# Patient Record
Sex: MALE | Race: WHITE | NOT HISPANIC OR LATINO | Employment: OTHER | URBAN - METROPOLITAN AREA
[De-identification: names, ages, dates, MRNs, and addresses within clinical notes are randomized per-mention and may not be internally consistent; named-entity substitution may affect disease eponyms.]

---

## 2018-08-23 PROBLEM — Z45.02 BIVENTRICULAR AICD AT END OF LIFE: Status: ACTIVE | Noted: 2018-08-23

## 2020-01-01 ENCOUNTER — LAB VISIT (OUTPATIENT)
Dept: LAB | Facility: HOSPITAL | Age: 70
End: 2020-01-01
Attending: INTERNAL MEDICINE
Payer: MEDICARE

## 2020-01-01 ENCOUNTER — INITIAL CONSULT (OUTPATIENT)
Dept: SURGICAL ONCOLOGY | Facility: CLINIC | Age: 70
End: 2020-01-01
Payer: MEDICARE

## 2020-01-01 ENCOUNTER — OFFICE VISIT (OUTPATIENT)
Dept: HEMATOLOGY/ONCOLOGY | Facility: CLINIC | Age: 70
End: 2020-01-01
Payer: MEDICARE

## 2020-01-01 ENCOUNTER — PATIENT MESSAGE (OUTPATIENT)
Dept: HEMATOLOGY/ONCOLOGY | Facility: CLINIC | Age: 70
End: 2020-01-01

## 2020-01-01 ENCOUNTER — TELEPHONE (OUTPATIENT)
Dept: SURGICAL ONCOLOGY | Facility: CLINIC | Age: 70
End: 2020-01-01

## 2020-01-01 ENCOUNTER — TELEPHONE (OUTPATIENT)
Dept: SURGERY | Facility: CLINIC | Age: 70
End: 2020-01-01

## 2020-01-01 ENCOUNTER — LAB VISIT (OUTPATIENT)
Dept: LAB | Facility: HOSPITAL | Age: 70
End: 2020-01-01
Attending: SURGERY
Payer: MEDICARE

## 2020-01-01 ENCOUNTER — HOSPITAL ENCOUNTER (OUTPATIENT)
Dept: RADIOLOGY | Facility: HOSPITAL | Age: 70
Discharge: HOME | End: 2020-05-21
Attending: SURGERY
Payer: MEDICARE

## 2020-01-01 VITALS
HEIGHT: 69 IN | SYSTOLIC BLOOD PRESSURE: 99 MMHG | RESPIRATION RATE: 18 BRPM | DIASTOLIC BLOOD PRESSURE: 66 MMHG | BODY MASS INDEX: 29.71 KG/M2 | WEIGHT: 200.63 LBS | TEMPERATURE: 98 F | HEART RATE: 82 BPM

## 2020-01-01 DIAGNOSIS — D49.89 NEOPLASM OF ABDOMEN: ICD-10-CM

## 2020-01-01 DIAGNOSIS — Z11.59 SCREENING EXAMINATION FOR POLIOMYELITIS: ICD-10-CM

## 2020-01-01 DIAGNOSIS — N28.89 RIGHT KIDNEY MASS: ICD-10-CM

## 2020-01-01 DIAGNOSIS — C25.1 CANCER OF PANCREAS, BODY: Primary | ICD-10-CM

## 2020-01-01 DIAGNOSIS — C25.1 CANCER OF PANCREAS, BODY: ICD-10-CM

## 2020-01-01 DIAGNOSIS — I50.42 CHRONIC COMBINED SYSTOLIC AND DIASTOLIC CONGESTIVE HEART FAILURE: ICD-10-CM

## 2020-01-01 LAB
CANCER AG19-9 SERPL-ACNC: 30 U/ML (ref 2–40)
CREAT SERPL-MCNC: 0.63 MG/DL (ref 0.5–1.4)
EST. GFR  (AFRICAN AMERICAN): >60 ML/MIN/1.73 M^2
EST. GFR  (NON AFRICAN AMERICAN): >60 ML/MIN/1.73 M^2
SARS-COV-2 RNA RESP QL NAA+PROBE: NOT DETECTED

## 2020-01-01 PROCEDURE — 1159F MED LIST DOCD IN RCRD: CPT | Mod: S$GLB,,, | Performed by: SURGERY

## 2020-01-01 PROCEDURE — 74177 CT ABD & PELVIS W/CONTRAST: CPT | Mod: TC,PO

## 2020-01-01 PROCEDURE — 99999 PR PBB SHADOW E&M-EST. PATIENT-LVL IV: CPT | Mod: PBBFAC,,, | Performed by: SURGERY

## 2020-01-01 PROCEDURE — 82565 ASSAY OF CREATININE: CPT

## 2020-01-01 PROCEDURE — 1125F AMNT PAIN NOTED PAIN PRSNT: CPT | Mod: S$GLB,,, | Performed by: SURGERY

## 2020-01-01 PROCEDURE — 99205 PR OFFICE/OUTPT VISIT, NEW, LEVL V, 60-74 MIN: ICD-10-PCS | Mod: 95,,, | Performed by: INTERNAL MEDICINE

## 2020-01-01 PROCEDURE — 1159F MED LIST DOCD IN RCRD: CPT | Mod: ,,, | Performed by: INTERNAL MEDICINE

## 2020-01-01 PROCEDURE — 1101F PR PT FALLS ASSESS DOC 0-1 FALLS W/OUT INJ PAST YR: ICD-10-PCS | Mod: ,,, | Performed by: INTERNAL MEDICINE

## 2020-01-01 PROCEDURE — 1159F PR MEDICATION LIST DOCUMENTED IN MEDICAL RECORD: ICD-10-PCS | Mod: S$GLB,,, | Performed by: SURGERY

## 2020-01-01 PROCEDURE — 1125F PR PAIN SEVERITY QUANTIFIED, PAIN PRESENT: ICD-10-PCS | Mod: S$GLB,,, | Performed by: SURGERY

## 2020-01-01 PROCEDURE — 25500020 PHARM REV CODE 255: Mod: PO | Performed by: SURGERY

## 2020-01-01 PROCEDURE — 71260 CT THORAX DX C+: CPT | Mod: 26,,, | Performed by: RADIOLOGY

## 2020-01-01 PROCEDURE — 86301 IMMUNOASSAY TUMOR CA 19-9: CPT

## 2020-01-01 PROCEDURE — 71260 CT CHEST ABDOMEN PELVIS WITH CONTRAST (XPD): ICD-10-PCS | Mod: 26,,, | Performed by: RADIOLOGY

## 2020-01-01 PROCEDURE — U0002 COVID-19 LAB TEST NON-CDC: HCPCS

## 2020-01-01 PROCEDURE — 74177 CT CHEST ABDOMEN PELVIS WITH CONTRAST (XPD): ICD-10-PCS | Mod: 26,,, | Performed by: RADIOLOGY

## 2020-01-01 PROCEDURE — 74177 CT ABD & PELVIS W/CONTRAST: CPT | Mod: 26,,, | Performed by: RADIOLOGY

## 2020-01-01 PROCEDURE — 1101F PT FALLS ASSESS-DOCD LE1/YR: CPT | Mod: S$GLB,,, | Performed by: SURGERY

## 2020-01-01 PROCEDURE — 36415 COLL VENOUS BLD VENIPUNCTURE: CPT | Mod: PN

## 2020-01-01 PROCEDURE — 1101F PR PT FALLS ASSESS DOC 0-1 FALLS W/OUT INJ PAST YR: ICD-10-PCS | Mod: S$GLB,,, | Performed by: SURGERY

## 2020-01-01 PROCEDURE — 99204 OFFICE O/P NEW MOD 45 MIN: CPT | Mod: S$GLB,,, | Performed by: SURGERY

## 2020-01-01 PROCEDURE — 99204 PR OFFICE/OUTPT VISIT, NEW, LEVL IV, 45-59 MIN: ICD-10-PCS | Mod: S$GLB,,, | Performed by: SURGERY

## 2020-01-01 PROCEDURE — 99999 PR PBB SHADOW E&M-EST. PATIENT-LVL IV: ICD-10-PCS | Mod: PBBFAC,,, | Performed by: SURGERY

## 2020-01-01 PROCEDURE — 1101F PT FALLS ASSESS-DOCD LE1/YR: CPT | Mod: ,,, | Performed by: INTERNAL MEDICINE

## 2020-01-01 PROCEDURE — 1159F PR MEDICATION LIST DOCUMENTED IN MEDICAL RECORD: ICD-10-PCS | Mod: ,,, | Performed by: INTERNAL MEDICINE

## 2020-01-01 PROCEDURE — 99205 OFFICE O/P NEW HI 60 MIN: CPT | Mod: 95,,, | Performed by: INTERNAL MEDICINE

## 2020-01-01 PROCEDURE — 82565 ASSAY OF CREATININE: CPT | Mod: PN

## 2020-01-01 RX ORDER — ONDANSETRON 4 MG/1
TABLET, ORALLY DISINTEGRATING ORAL
COMMUNITY
Start: 2020-01-01 | End: 2020-01-01 | Stop reason: CLARIF

## 2020-01-01 RX ORDER — ACETAMINOPHEN 500 MG
5000 TABLET ORAL DAILY
COMMUNITY

## 2020-01-01 RX ORDER — PANTOPRAZOLE SODIUM 40 MG/1
TABLET, DELAYED RELEASE ORAL
COMMUNITY
Start: 2020-01-01 | End: 2020-01-01 | Stop reason: CLARIF

## 2020-01-01 RX ORDER — QUETIAPINE FUMARATE 25 MG/1
TABLET, FILM COATED ORAL
COMMUNITY
End: 2020-01-01 | Stop reason: CLARIF

## 2020-01-01 RX ORDER — CIPROFLOXACIN 500 MG/1
TABLET ORAL
COMMUNITY
End: 2020-01-01 | Stop reason: CLARIF

## 2020-01-01 RX ORDER — CARVEDILOL 25 MG/1
TABLET ORAL
Status: ON HOLD | COMMUNITY
End: 2020-01-01 | Stop reason: SDUPTHER

## 2020-01-01 RX ADMIN — IOHEXOL 1000 ML: 12 SOLUTION ORAL at 04:05

## 2020-01-01 RX ADMIN — IOHEXOL 100 ML: 350 INJECTION, SOLUTION INTRAVENOUS at 04:05

## 2020-05-08 PROBLEM — K86.89 PANCREATIC MASS: Status: ACTIVE | Noted: 2020-01-01

## 2020-05-20 PROBLEM — C25.1: Status: ACTIVE | Noted: 2020-01-01

## 2020-05-20 PROBLEM — N28.89 RIGHT KIDNEY MASS: Status: ACTIVE | Noted: 2020-01-01

## 2020-05-20 PROBLEM — I50.42 CHRONIC COMBINED SYSTOLIC AND DIASTOLIC CONGESTIVE HEART FAILURE: Status: ACTIVE | Noted: 2020-01-01

## 2020-05-20 NOTE — PROGRESS NOTES
CHIEF COMPLAINT: Gene Handley Jr. is a 69 y.o. male who presented to our clinic with body of pancreas mass and signs of cancer on cytology.     HPI:  Mr. Handley presented to his primary care doctor mid March with mild abdominal pain and acid reflux and was prescribed pantoprazole without alleviation. He was referred to a gastroenterologist who performed EUS and CT and found to have a cystic lesion on the body of his pancreas measuring [id number] and fluid collection around the tail of the pancreas and spleen measuring [id number]. Cytology results showed poorly differentiated carcinomas with squamous features from the body and mucinous glandular cells from the tail. CT scan shows the lesions as well as a renal mass. He already followed up with a urologist who thinks the mass could be RCC. Due to it's indolent course, his urologist wants to coordinate with surgical oncology for his pancreatic tumor.     Today, he is feeling fatigued. He endorses mild epigastric pain that is well managed by extra strength tylenol, pain is worsen with food. He also has decreased appetite over the last month and lost 25#. He stopped taking pantoprazole and is taking Zofran PRN for nausea. He denies fever, SOB, chest pain, or leg swellings. Functionally he is able to walk up a flight of stairs without issue. He has a history of BCC removed years ago by dermatologist and a current suspicious lesion on his left upper abdomen that has not been assessed. He also has a history of idiopathic cardiomyopathy with implantable defibrillator. His current EF is 30-35%, and he is following up closely with cardiology.     PAST MEDICAL HISTORY:  Past Medical History:   Diagnosis Date    Arthritis     Cancer     skin    Cardiomyopathy     CHF (congestive heart failure)     Coronary artery disease     Heartburn     Hypertension        PAST SURGICAL HISTORY:  Past Surgical History:   Procedure Laterality Date    CARDIAC DEFIBRILLATOR PLACEMENT      x 3     ENDOSCOPIC ULTRASOUND OF UPPER GASTROINTESTINAL TRACT Left 5/8/2020    Procedure: ULTRASOUND, UPPER GI TRACT, ENDOSCOPIC;  Surgeon: Benjamin Lema MD;  Location: [facility] ENDO;  Service: Endoscopy;  Laterality: Left;  Linear scope    ESOPHAGOGASTRODUODENOSCOPY N/A 5/8/2020    Procedure: EGD (ESOPHAGOGASTRODUODENOSCOPY);  Surgeon: Benjamin Lema MD;  Location: [facility] ENDO;  Service: Endoscopy;  Laterality: N/A;    REPLACEMENT OF IMPLANTABLE CARDIOVERTER-DEFIBRILLATOR (ICD) GENERATOR N/A 8/23/2018    Procedure: REPLACEMENT, BI-V ICD GENERATOR/LV LEAD REVISION medtronic;  Surgeon: Troy Barron III, MD;  Location: [facility] CATH;  Service: Cardiology;  Laterality: N/A;    SKIN BIOPSY      several    trigger thumb surgery Left        FAMILY HISTORY:  Family History   Problem Relation Age of Onset    Cancer Mother         lung    Heart disease Father        ROS:  Review of Systems   Constitutional: Positive for activity change, appetite change, fatigue and unexpected weight change. Negative for fever.   HENT: Negative for congestion, mouth sores and sore throat.    Respiratory: Negative for cough and shortness of breath.    Cardiovascular: Negative for chest pain, palpitations and leg swelling.   Gastrointestinal: Positive for abdominal pain and nausea. Negative for abdominal distention, blood in stool, constipation, diarrhea and vomiting.   Genitourinary: Negative for difficulty urinating and dysuria.   Musculoskeletal: Negative for myalgias and neck pain.   Skin: Negative for color change and pallor.   Neurological: Negative for dizziness, speech difficulty and headaches.   Hematological: Does not bruise/bleed easily.   Psychiatric/Behavioral: Negative for agitation.       MEDICATIONS:     Medication List           Accurate as of May 20, 2020 10:55 AM. If you have any questions, ask your nurse or doctor.               CHANGE how you take these medications    pantoprazole 40 MG tablet  Commonly known as:   PROTONIX  What changed:  Another medication with the same name was removed. Continue taking this medication, and follow the directions you see here.  Changed by:  Mariusz Galvan MD        CONTINUE taking these medications    ascorbic acid (vitamin C) 1000 MG tablet  Commonly known as:  VITAMIN C     ASTAXANTHIN ORAL     * carvediloL 25 MG tablet  Commonly known as:  COREG     * carvediloL 25 MG tablet  Commonly known as:  COREG     * cholecalciferol (vitamin D3) 1,250 mcg (50,000 unit) capsule     * vitamin D 1000 units Tab  Commonly known as:  VITAMIN D3     ciprofloxacin HCl 500 MG tablet  Commonly known as:  CIPRO     COQ10 (LIPOSOMAL UBIQUINOL) ORAL     GARLIC EXTRACT ORAL     magnesium 250 mg Tab     ondansetron 4 MG Tbdl  Commonly known as:  ZOFRAN-ODT     ONE DAILY MULTIVITAMIN per tablet  Generic drug:  multivitamin     QUEtiapine 25 MG Tab  Commonly known as:  SEROQUEL     TURMERIC ROOT EXTRACT ORAL     VITAMIN K2 ORAL         * This list has 4 medication(s) that are the same as other medications prescribed for you. Read the directions carefully, and ask your doctor or other care provider to review them with you.            STOP taking these medications    BERBERINE-HERBAL COMB NO.18 ORAL  Stopped by:  Mariusz Galvan MD     CARNITINE (TARTRATE) ORAL  Stopped by:  Mariusz Galvan MD     furosemide 20 MG tablet  Commonly known as:  LASIX  Stopped by:  Mariusz Galvan MD            SOCIAL HISTORY:  Social History     Socioeconomic History    Marital status:      Spouse name: Not on file    Number of children: Not on file    Years of education: Not on file    Highest education level: Not on file   Occupational History    Not on file   Social Needs    Financial resource strain: Not on file    Food insecurity:     Worry: Not on file     Inability: Not on file    Transportation needs:     Medical: Not on file     Non-medical: Not on file   Tobacco Use    Smoking status: Never Smoker    Smokeless  tobacco: Never Used   Substance and Sexual Activity    Alcohol use: Not Currently     Alcohol/week: 2.0 standard drinks     Types: 2 Glasses of wine per week    Drug use: No    Sexual activity: Not on file   Lifestyle    Physical activity:     Days per week: Not on file     Minutes per session: Not on file    Stress: Only a little   Relationships    Social connections:     Talks on phone: Not on file     Gets together: Not on file     Attends Gnosticist service: Not on file     Active member of club or organization: Not on file     Attends meetings of clubs or organizations: Not on file     Relationship status: Not on file   Other Topics Concern    Not on file   Social History Narrative    Not on file       RACE / BMI:  Race: White  BMI: Body mass index is 29.63 kg/m².    ALLERGIES:  Review of patient's allergies indicates:  No Known Allergies      LABS:  Tumor Markers:  No results found for: CEA, AMYLASE, ASPIRATE, ZVA385, , JK7825, AFP, AFPTM    Nutritional:  Lab Results   Component Value Date    ALBUMIN 4.6 09/13/2008       LFTs:  Lab Results   Component Value Date    ALBUMIN 4.6 09/13/2008    BILITOT 0.7 09/13/2008    BILIDIR 0.1 02/15/2008    AST 30 09/13/2008    PROT 7.0 09/13/2008       CMP:  Lab Results   Component Value Date     (H) 08/23/2018    CALCIUM 9.0 08/23/2018    ALBUMIN 4.6 09/13/2008    PROT 7.0 09/13/2008     08/23/2018    K 4.4 08/23/2018    CO2 25 08/23/2018     08/23/2018    BUN 15 08/23/2018    CREATININE 0.62 08/23/2018    ALKPHOS 58 09/13/2008    ALT 45 (H) 09/13/2008    AST 30 09/13/2008    BILITOT 0.7 09/13/2008     RADIOGRAPHIC FINDINGS:  See outside records in [facility] reports.     PATH:  DIAGNOSIS:  05/11/2020 JAR/jar    1. PANCREATIC BODY CYST, FINE NEEDLE ASPIRATION  - POSITIVE FOR POORLY DIFFERENTIATED CARCINOMA WITH SQUAMOUS FEATURES  - SEE COMMENT.    2. YOLIS PANCREATIC FLUID COLLECTION, FINE NEEDLE ASPIRATION  - MUCINOUS GLANDULAR CELLS  - NEGATIVE FOR  HIGH GRADE DYSPLASIA OR CARCINOMA    Comment:  Diagnostic considerations include primary adenosquamous carcinoma or   less likely metastatic squamous cell carcinoma.    The peripancreatic fluid collection may represent portions of an   intraductal papillary mucinous neoplasm.    _______________________________________________________________________  SPECIMEN AND SOURCE:  1. Pancreatic body cyst  2. Citlaly pancreatic fluid collection     CLINICAL INFORMATION:  Clinical Information:-gt 7+cm citlaly-pancreatic fluid collection,   adjacent to the tail of the pancreas. EUS guided FNA performed with   fluid aspirated. To be sent for CEA, cytology, and amylase, in that   order of importance.  S    GROSS DESCRIPTION:  Received 2mL of fluid with 32ml in Cytolyt. Prepared one ThinPrep and   one cell block.  Received 1mL of fluid with 31ml in Cytolyt. Prepared one ThinPrep and   one cell block.    Cytotechnologist: CHAPIN Saha (ASCP)  Pathologist: Yogesh Irving MD (Electronic Signature) 05/11/2020 2:18   PM    [facility], [facility] * [address] [place] [address] * [place] [address]  Technical services performed at: [facility] * [address] * [place] [address]  Screening Site: [facility] * [address] [place] [address] *   [place] [address]    PHYSICAL EXAM:  Physical Exam   Constitutional: He is oriented to person, place, and time. He appears well-developed and well-nourished. No distress.   HENT:   Head: Normocephalic and atraumatic.   Eyes: EOM are normal. Right eye exhibits no discharge. Left eye exhibits no discharge.   Neck: Normal range of motion. Neck supple.   Cardiovascular: Normal rate, regular rhythm, normal heart sounds and intact distal pulses.   Pulmonary/Chest: Effort normal and breath sounds normal. No respiratory distress.   Abdominal: Soft. Bowel sounds are normal. He exhibits no distension and no mass. There is no tenderness.   Musculoskeletal: Normal range  of motion. He exhibits no edema or deformity.   Neurological: He is alert and oriented to person, place, and time.   Skin: Skin is warm and dry. No rash noted. He is not diaphoretic. No erythema. There is pallor.   Psychiatric: He has a normal mood and affect.   Vitals reviewed.      ASSESSMENT/PLAN:  Gene Handley Jr. is a 69 y.o. male presented with     - CT chest with contrast ordered for staging   -  ordered  - Referred to a medical oncologist for assessment of neoadj or adj chemo. He does not like the idea of having chemotherapy. We explained to him that this is typically the course of treatment for a large tumor that's possibly infiltrated into the spleen, stomach, and splenic flexure and he is agreeable to see a medical oncologist.     All patients questions are answered to his satisfaction.    Mirella Hill  MS4  The above assessment and plan was discussed with the rest of the team. Please refer to resident/attending notes for definitive plans and dispositions.

## 2020-05-20 NOTE — LETTER
May 20, 2020        ZURDO Garces MD  [address]  [address]  [facility] [address]             [facility]  [address] [facility] [facility] [address]  [facility] [address]  Phone: [phone]  Fax: [phone]   Patient: Gene Handley Jr.   MR Number: [id number]   YOB: 1950   Date of Visit: 5/20/2020       Dear Dr. Garces:    Thank you for referring Gene Handley to me for evaluation. Attached you will find relevant portions of my assessment and plan of care.    If you have questions, please do not hesitate to call me. I look forward to following Gene Handley along with you.    Sincerely,      Mariusz Galvan MD            CC  No Recipients    Enclosure

## 2020-05-20 NOTE — PROGRESS NOTES
Patient seen with Beverly Hill MS4; history and PE personally obtained/performed; outside CT personally reviewed along with outside records and I have also spoken to Dr Lema.     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 He presented primarily with heartburn and fatigue, with only mild and vague LUQ discomfort. Initial EGD showed a hiatal hernia but his symptoms did not improve with PPIs so a CT was done, which showed a markedly abnormal body and tail of the pancreas, and also showed a small (3 cm) right renal mass suspicious for RCC.   EUS by Dr Lema showed a 2.2 cm cystic lesion of the body of the pancreas, morphologically c/w IPMN with no worrisome features. Cyst fluid from this lesion showed a CEA of 459 and amylase of 441, and FNAC of the fluid showed poorly differentiated carcinoma with squamous features. EUS also showed a large (7cm)  Heterogeneous mass of the tail of the pancreas and hilum of the spleen thought possibly to be a para-pancreatic fluid collection. Cyst fluid aspirated from this was thick and bloody, and showed CEA of 19 and amylase of 9,724. Cytology showed mucinous glandular cells but no benedict cancer.     CT also showed several small liver lesions including a subcapsular left lateral liver lesion felt to possible be c/w metastatic disease.     I have personally reviewed the scan, and the large, complex mass of the tail of pancreas appears to invade the spleen and densely abuts the back wall of the stomach and probably involves the splenic flexure of the colon as well. It does appear resectable; however, this will be a difficult and multivisceral resection in all likelihood.     Long talk with Dr Handley, emphasizing several items:    --historically, the prognosis of cancer of the body and tail of the pancreas has not been good, even with resection    --current optimal therapy includes both surgical resection and chemotherapy, and often giving at least some of  the chemotherapy upfront ensures that he will receive the systemic therapy in a timely fashion since postoperative problems after a complex, multivisceral resection may delay postop chemotherapy    --it is not possible to know whether all of the very large mass involving the tail and spleen is cancer, or a pseudocyst from rupture of a cystic neoplasm, or a pre-existing pre-malignant cystic neoplasm. However, I think we need to presume that all of the mass is part of the neoplastic process.    I have recommended CT of the chest to complete staging, a Ca19-9 level, and consultation with Med Onc to consider neoadjuvant chemotherapy.  He is reluctant to have chemotherapy but is willing to be evaluated and to discuss it.   Also needs pre-splenectomy vacinations      45 minutes spent in today's visit, most of it in counselling and answering questions     Copy Migue Garces and Torey

## 2020-05-20 NOTE — LETTER
May 20, 2020      ZURDO Garces MD  [address]  [address]  [facility] [address]           [facility]  [address] [facility] [address]  [facility] [address]  Phone: [phone]  Fax: [phone]          Patient: Gene Handley Jr.   MR Number: [id number]   YOB: 1950   Date of Visit: 5/20/2020       Dear Dr. ZURDO Garces:    Thank you for referring Gene Handley to me for evaluation. Attached you will find relevant portions of my assessment and plan of care.    If you have questions, please do not hesitate to call me. I look forward to following Gene Handley along with you.    Sincerely,    Mariusz Galvan MD    Enclosure  CC:  No Recipients    If you would like to receive this communication electronically, please contact [email or web address] or [phone] to request more information on [facility] Mobile Link access.    For providers and/or their staff who would like to refer a patient to [facility], please contact us through our one-stop-shop provider referral line, [facility], at [phone].    If you feel you have received this communication in error or would no longer like to receive these types of communications, please e-mail [email or web address]

## 2020-05-22 NOTE — TELEPHONE ENCOUNTER
Phone call to Dr Handley:  Ca19-9 level is 30  CT scan shows:  --two small (4-6mm) pulmonary nodules  --one subcapsular 14 mm liver hypodensity  ---a small right kidney mass, probably neoplastic (previously identified on prior scan and felt most likely to be a primary right RCC)  ---the large pancreatic tail mass does involve the back wall of the stomach and the splenic flexure of the colon and extends close to the upper left kidney    Overall, the results show at least a locally advanced but potentially resectable cancer of the distal pancreas, with a solitary small liver hypodensity and two small pulmonary nodules which can not definitely be characterized. The lung nodules are too small for biopsy and would probably not show up on PET; the liver lesion might be amenable to percutaneous biopsy but is probably not an easy target.   He meets with Med Onc today. I would favor neoadjuvant chemotherapy although his initial reaction to this was not favorable.   If, after his visit with Med Onc, he is not agreeable to neoadjuvant chemo, will plan either IR biopsy of the liver nodule, or, preliminary laparoscopic eval and biopsy before proceeding with resection of the pancreatic primary.   He will need pre-splenectomy vaccinations.   The right renal lesion would also be addressed at surgery.

## 2020-06-05 NOTE — PROGRESS NOTES
HPI    69 years old  male metastatic pancreatic cancer referred to Oncology for recommendations.    Denies any chest pain shortness breath.  Denies any abdominal pain nausea vomiting or diarrhea.      Past Medical History:   Diagnosis Date    Arthritis     Cancer     skin    Cardiomyopathy     CHF (congestive heart failure)     Coronary artery disease     Heartburn     Hypertension      Social History     Socioeconomic History    Marital status:      Spouse name: Not on file    Number of children: Not on file    Years of education: Not on file    Highest education level: Not on file   Occupational History    Not on file   Social Needs    Financial resource strain: Not on file    Food insecurity:     Worry: Not on file     Inability: Not on file    Transportation needs:     Medical: Not on file     Non-medical: Not on file   Tobacco Use    Smoking status: Never Smoker    Smokeless tobacco: Never Used   Substance and Sexual Activity    Alcohol use: Not Currently     Alcohol/week: 2.0 standard drinks     Types: 2 Glasses of wine per week    Drug use: No    Sexual activity: Not on file   Lifestyle    Physical activity:     Days per week: Not on file     Minutes per session: Not on file    Stress: Only a little   Relationships    Social connections:     Talks on phone: Not on file     Gets together: Not on file     Attends Voodoo service: Not on file     Active member of club or organization: Not on file     Attends meetings of clubs or organizations: Not on file     Relationship status: Not on file   Other Topics Concern    Not on file   Social History Narrative    Not on file         Subjective      Review of Systems   Constitutional: Negative for appetite change, fatigue and unexpected weight change.   HENT: Negative for mouth sores.   Eyes: Negative for visual disturbance.   Respiratory: Negative for cough and shortness of breath.   Cardiovascular: Negative for chest  pain.   Gastrointestinal: Negative for diarrhea.   Genitourinary: Negative for frequency.   Musculoskeletal: Negative for back pain.   Skin: Negative for rash.   Neurological: Negative for headaches.   Hematological: Negative for adenopathy.   Psychiatric/Behavioral: The patient is not nervous/anxious.   All other systems reviewed and are negative.     Objective    Physical Exam   There were no vitals filed for this visit.  Virtual visit  Awake alert no acute distress  Normal speech pattern  No respiratory distress  No appearance of focal deficit      CMP  Sodium   Date Value Ref Range Status   08/23/2018 138 136 - 145 mmol/L Final     Potassium   Date Value Ref Range Status   08/23/2018 4.4 3.5 - 5.1 mmol/L Final     Chloride   Date Value Ref Range Status   08/23/2018 106 95 - 110 mmol/L Final     CO2   Date Value Ref Range Status   08/23/2018 25 22 - 31 mmol/L Final     Glucose   Date Value Ref Range Status   08/23/2018 112 (H) 70 - 110 mg/dL Final     Comment:     The ADA recommends the following guidelines for fasting glucose:  Normal:       less than 100 mg/dL  Prediabetes:  100 mg/dL to 125 mg/dL  Diabetes:     126 mg/dL or higher       BUN, Bld   Date Value Ref Range Status   08/23/2018 15 9 - 21 mg/dL Final     Creatinine   Date Value Ref Range Status   05/20/2020 0.63 0.50 - 1.40 mg/dL Final     Calcium   Date Value Ref Range Status   08/23/2018 9.0 8.4 - 10.2 mg/dL Final     Total Protein   Date Value Ref Range Status   09/13/2008 7.0 6.0 - 8.4 gm/dl Final     Albumin   Date Value Ref Range Status   09/13/2008 4.6 3.5 - 5.2 g/dl Final     Total Bilirubin   Date Value Ref Range Status   09/13/2008 0.7 0.1 - 1.0 mg/dl Final     Comment:     For infants and newborns, interpretation of results should be based  on gestational age, weight and in agreement with clinical  observations.  .  Premature Infant recommended reference ranges:  Up to 24 hours.............<8.0 mg/dl  Up to 48 hours............<12.0  mg/dl  3-5 days..................<15.0 mg/dl  6-29 days.................<15.0 mg/dl       Alkaline Phosphatase   Date Value Ref Range Status   09/13/2008 58 55 - 135 U/L Final     AST   Date Value Ref Range Status   09/13/2008 30 10 - 40 U/L Final     ALT   Date Value Ref Range Status   09/13/2008 45 (H) 10 - 44 U/L Final     Anion Gap   Date Value Ref Range Status   08/23/2018 7 (L) 8 - 16 mmol/L Final     eGFR if    Date Value Ref Range Status   05/20/2020 >60 >60 mL/min/1.73 m^2 Final     eGFR if non    Date Value Ref Range Status   05/20/2020 >60 >60 mL/min/1.73 m^2 Final     Comment:     Calculation used to obtain the estimated glomerular filtration  rate (eGFR) is the CKD-EPI equation.        Lab Results   Component Value Date    WBC 8.86 09/15/2007    HGB 14.7 09/15/2007    HCT 42.4 09/15/2007    MCV 91.2 09/15/2007     09/15/2007       Impression 05/21/2020 CT chest abdomen pelvis with contrast       1. 6.5 x 7.5 x 7.8 cm heterogeneously enhancing solid pancreatic tail mass which show central necrosis.  There is direct invasion of the adjacent greater curvature of the stomach, posterior wall of the proximal descending colon, and spleen.  This is highly suggestive of an aggressive pancreatic neoplasm.  Metastatic disease could theoretically produce a similar appearance.  There is a smaller 22 mm hypodensity within the ventral aspect of the pancreatic body which could relate to a pseudocyst or to a focus of metastatic disease.  2. 36 x 38 mm hypodense mass at the lower pole of the right kidney which measures greater than water attenuation.  While this could certainly represent a complicated cyst, a solid mass related to either renal cell carcinoma or metastatic disease cannot be excluded.  3. 14 x 25 mm subcapsular hypodense mass within the right hepatic lobe-segment VI.  There is a smaller hypodensity in the lateral segment of the left hepatic lobe which appears to  represent a simple cyst.  4. Bilateral non-obstructing nephrolithiasis.  5. Enlarged heterogeneous appearance of the liver, likely a combination of direct invasion by the patient's pancreatic tail tumor and multiple splenic infarcts due to occlusion of the splenic vein (as a result of compression by or invasion of the splenic vein by the patient's pancreatic tail tumor).  6. Two solid pulmonary nodules abutting the interlobar fissures, one in each lower lobe, which measure up to 7 mm.  For multiple solid nodules with any 6 mm or greater, Fleischner Society guidelines recommend follow up with non-contrast chest CT at 3-6 months and 18-24 months after discovery.  7. Diverticulosis coli.  This report was flagged in Epic as abnormal.         Assessment Plan    [] Stage IV pancreatic cancer involve stomach liver and lung.  CT scan obtained 05/21/2020  > long discussion regarding patient's medical condition   > patient elected not to pursue any systemic therapy  > patient will follow-up with GI doctor  > hematology oncology p.r.n. sure patient change his mind regarding therapeutic modality.        There are no diagnoses linked to this encounter.

## 2020-06-05 NOTE — LETTER
June 5, 2020      Mariusz Galvan MD  [address] [facility] [address]           [facility] [address]  [facility] [address]  Phone: [phone]  Fax: [phone]          Patient: Gene Handley Jr.   MR Number: [id number]   YOB: 1950   Date of Visit: 6/5/2020       Dear Dr. Mariusz Galvan:    Thank you for referring Gene Handley to me for evaluation. Attached you will find relevant portions of my assessment and plan of care.    If you have questions, please do not hesitate to call me. I look forward to following Gene Handley along with you.    Sincerely,    Tom Silver MD    Enclosure  CC:  No Recipients    If you would like to receive this communication electronically, please contact [email or web address] or [phone] to request more information on [facility] access.    For providers and/or their staff who would like to refer a patient to [facility], please contact us through our one-stop-shop provider referral line, [facility], at [phone].    If you feel you have received this communication in error or would no longer like to receive these types of communications, please e-mail [email or web address]

## 2020-06-22 PROBLEM — Z79.1 NSAID LONG-TERM USE: Status: ACTIVE | Noted: 2020-01-01

## 2020-06-22 PROBLEM — D64.9 SYMPTOMATIC ANEMIA: Status: ACTIVE | Noted: 2020-01-01

## 2020-06-22 PROBLEM — D62 ACUTE BLOOD LOSS ANEMIA: Status: ACTIVE | Noted: 2020-01-01

## 2020-07-20 PROBLEM — Z71.89 ADVANCED CARE PLANNING/COUNSELING DISCUSSION: Status: ACTIVE | Noted: 2020-01-01

## 2020-07-20 PROBLEM — J90 PLEURAL EFFUSION ON LEFT: Status: ACTIVE | Noted: 2020-01-01

## 2020-07-25 PROBLEM — C25.9 PANCREATIC CANCER: Status: ACTIVE | Noted: 2020-01-01

## 2020-07-25 PROBLEM — E87.1 HYPONATREMIA: Status: ACTIVE | Noted: 2020-01-01

## 2021-04-29 ENCOUNTER — PATIENT MESSAGE (OUTPATIENT)
Dept: RESEARCH | Facility: HOSPITAL | Age: 71
End: 2021-04-29